# Patient Record
Sex: MALE | Race: WHITE | HISPANIC OR LATINO | Employment: OTHER | URBAN - METROPOLITAN AREA
[De-identification: names, ages, dates, MRNs, and addresses within clinical notes are randomized per-mention and may not be internally consistent; named-entity substitution may affect disease eponyms.]

---

## 2023-05-01 ENCOUNTER — MEDICAL CORRESPONDENCE (OUTPATIENT)
Dept: HEALTH INFORMATION MANAGEMENT | Facility: CLINIC | Age: 78
End: 2023-05-01

## 2023-06-13 RX ORDER — FOLIC ACID 1 MG/1
1 TABLET ORAL DAILY
COMMUNITY

## 2023-06-13 RX ORDER — CHOLECALCIFEROL (VITAMIN D3) 50 MCG
1 TABLET ORAL DAILY
COMMUNITY

## 2023-06-13 RX ORDER — FLUTICASONE PROPIONATE 50 MCG
1 SPRAY, SUSPENSION (ML) NASAL DAILY
COMMUNITY

## 2023-06-13 RX ORDER — AMLODIPINE BESYLATE 5 MG/1
0.5 TABLET ORAL DAILY
COMMUNITY

## 2023-06-13 RX ORDER — KETOCONAZOLE 20 MG/G
CREAM TOPICAL 2 TIMES DAILY
COMMUNITY

## 2023-06-13 RX ORDER — CLOTRIMAZOLE 1 %
CREAM (GRAM) TOPICAL 2 TIMES DAILY
COMMUNITY

## 2023-06-13 RX ORDER — HYDROCORTISONE ACETATE, IODOQUINOL 19; 10 MG/G; MG/G
CREAM TOPICAL 2 TIMES DAILY
COMMUNITY

## 2023-08-03 ENCOUNTER — TRANSFERRED RECORDS (OUTPATIENT)
Dept: MULTI SPECIALTY CLINIC | Facility: CLINIC | Age: 78
End: 2023-08-03

## 2023-08-03 LAB
ALT SERPL-CCNC: <5 IU/L (ref 10–50)
AST SERPL-CCNC: 19 IU/L (ref 10–50)
CHOLESTEROL (EXTERNAL): 127 MG/DL (ref 100–199)
CREATININE (EXTERNAL): 0.82 MG/DL (ref 0.7–1.2)
GFR ESTIMATED (EXTERNAL): 90 ML/MIN/1.73M2
GLUCOSE (EXTERNAL): 91 MG/DL (ref 70–99)
HBA1C MFR BLD: 5.3 %
HDLC SERPL-MCNC: 48 MG/DL
LDL CHOLESTEROL CALCULATED (EXTERNAL): 68 MG/DL
NON HDL CHOLESTEROL (EXTERNAL): 79 MG/DL
POTASSIUM (EXTERNAL): 4 MMOL/L (ref 3.5–5.1)
TRIGLYCERIDES (EXTERNAL): 54 MG/DL
TSH SERPL-ACNC: 2.58 UIU/ML (ref 0.27–4.2)

## 2023-08-16 RX ORDER — FERROUS GLUCONATE 324(38)MG
1 TABLET ORAL
COMMUNITY

## 2023-08-16 NOTE — PROGRESS NOTES
PTA medications updated by Medication Scribe prior to surgery via phone call with patient (last doses completed by Nurse)     Medication history sources: Patient's family/friend (Karla (spouse)) and H&P  In the past week, patient estimated taking medication this percent of the time: Greater than 90%      Significant changes made to the medication list:  None      Additional medication history information:   Patient was advised to bring: Refresh OP, Flonase Nasal [place]    Medication reconciliation completed by provider prior to medication history? No    Time spent in this activity: 25 minutes    The information provided in this note is only as accurate as the sources available at the time of update(s)    Prior to Admission medications    Medication Sig Last Dose Taking? Auth Provider Long Term End Date   amLODIPine (NORVASC) 5 MG tablet Take 0.5 tablets by mouth daily (0.5 x 5 mg = 2.5 mg)  at am Yes Reported, Patient Yes    clotrimazole (LOTRIMIN) 1 % external cream Apply topically 2 times daily 8/16/2023 at pm Yes Reported, Patient     Cyanocobalamin (B-12 PO) Take 1 tablet by mouth daily 8/16/2023 at am Yes Reported, Patient     ferrous gluconate (FERGON) 324 (38 Fe) MG tablet Take 1 tablet by mouth daily (with breakfast) 8/16/2023 at am Yes Reported, Patient     fluticasone (FLONASE) 50 MCG/ACT nasal spray Spray 1 spray into both nostrils daily 8/16/2023 at am Yes Reported, Patient     folic acid (FOLVITE) 1 MG tablet Take 1 tablet by mouth daily 8/16/2023 at am Yes Reported, Patient     Iodoquinol-Hydrocortisone-Aloe 1-1.9 % CREA Externally apply topically 2 times daily 8/16/2023 at pm Yes Reported, Patient     ketoconazole (NIZORAL) 2 % external cream Apply topically 2 times daily 8/16/2023 at pm Yes Reported, Patient     Propylene Glycol-Glycerin 1-0.3 % SOLN Place 1 drop into both eyes as needed Unknown at prn Yes Reported, Patient     vitamin D3 (CHOLECALCIFEROL) 50 mcg (2000 units) tablet Take 1 tablet by  mouth daily 8/16/2023 at am Yes Reported, Patient         Medication history completed by:    Tariq Martinez CPhT  Medication [facility]

## 2023-08-17 ENCOUNTER — HOSPITAL ENCOUNTER (OUTPATIENT)
Facility: CLINIC | Age: 78
Discharge: HOME | End: 2023-08-18
Attending: ORTHOPAEDIC SURGERY | Admitting: ORTHOPAEDIC SURGERY
Payer: COMMERCIAL

## 2023-08-17 ENCOUNTER — APPOINTMENT (OUTPATIENT)
Dept: GENERAL RADIOLOGY | Facility: CLINIC | Age: 78
End: 2023-08-17
Attending: ORTHOPAEDIC SURGERY
Payer: COMMERCIAL

## 2023-08-17 ENCOUNTER — ANESTHESIA EVENT (OUTPATIENT)
Dept: SURGERY | Facility: CLINIC | Age: 78
End: 2023-08-17
Payer: COMMERCIAL

## 2023-08-17 ENCOUNTER — ANESTHESIA (OUTPATIENT)
Dept: SURGERY | Facility: CLINIC | Age: 78
End: 2023-08-17
Payer: COMMERCIAL

## 2023-08-17 DIAGNOSIS — Z96.662 ANKLE JOINT REPLACEMENT STATUS, LEFT: Primary | ICD-10-CM

## 2023-08-17 PROCEDURE — 999N000179 XR SURGERY CARM FLUORO LESS THAN 5 MIN W STILLS

## 2023-08-17 PROCEDURE — 258N000003 HC RX IP 258 OP 636: Performed by: STUDENT IN AN ORGANIZED HEALTH CARE EDUCATION/TRAINING PROGRAM

## 2023-08-17 PROCEDURE — 999N000141 HC STATISTIC PRE-PROCEDURE NURSING ASSESSMENT: Performed by: ORTHOPAEDIC SURGERY

## 2023-08-17 PROCEDURE — 250N000009 HC RX 250: Performed by: ORTHOPAEDIC SURGERY

## 2023-08-17 PROCEDURE — C1713 ANCHOR/SCREW BN/BN,TIS/BN: HCPCS | Performed by: ORTHOPAEDIC SURGERY

## 2023-08-17 PROCEDURE — 250N000011 HC RX IP 250 OP 636: Performed by: PHYSICIAN ASSISTANT

## 2023-08-17 PROCEDURE — 258N000003 HC RX IP 258 OP 636: Performed by: PHYSICIAN ASSISTANT

## 2023-08-17 PROCEDURE — 271N000001 HC OR GENERAL SUPPLY NON-STERILE: Performed by: ORTHOPAEDIC SURGERY

## 2023-08-17 PROCEDURE — 272N000001 HC OR GENERAL SUPPLY STERILE: Performed by: ORTHOPAEDIC SURGERY

## 2023-08-17 PROCEDURE — 250N000009 HC RX 250: Performed by: ANESTHESIOLOGY

## 2023-08-17 PROCEDURE — C1776 JOINT DEVICE (IMPLANTABLE): HCPCS | Performed by: ORTHOPAEDIC SURGERY

## 2023-08-17 PROCEDURE — 360N000084 HC SURGERY LEVEL 4 W/ FLUORO, PER MIN: Performed by: ORTHOPAEDIC SURGERY

## 2023-08-17 PROCEDURE — 370N000017 HC ANESTHESIA TECHNICAL FEE, PER MIN: Performed by: ORTHOPAEDIC SURGERY

## 2023-08-17 PROCEDURE — 710N000009 HC RECOVERY PHASE 1, LEVEL 1, PER MIN: Performed by: ORTHOPAEDIC SURGERY

## 2023-08-17 PROCEDURE — 250N000009 HC RX 250: Performed by: NURSE ANESTHETIST, CERTIFIED REGISTERED

## 2023-08-17 PROCEDURE — 250N000025 HC SEVOFLURANE, PER MIN: Performed by: ORTHOPAEDIC SURGERY

## 2023-08-17 PROCEDURE — 250N000011 HC RX IP 250 OP 636: Mod: JZ | Performed by: PHYSICIAN ASSISTANT

## 2023-08-17 PROCEDURE — 250N000013 HC RX MED GY IP 250 OP 250 PS 637: Performed by: PHYSICIAN ASSISTANT

## 2023-08-17 PROCEDURE — 258N000003 HC RX IP 258 OP 636: Performed by: NURSE ANESTHETIST, CERTIFIED REGISTERED

## 2023-08-17 PROCEDURE — 250N000011 HC RX IP 250 OP 636: Performed by: NURSE ANESTHETIST, CERTIFIED REGISTERED

## 2023-08-17 DEVICE — TALAR DOME, SIZE 1, LEFT
Type: IMPLANTABLE DEVICE | Site: ANKLE | Status: FUNCTIONAL
Brand: SALTO TALARIS®

## 2023-08-17 DEVICE — IMP INSERT TORNIER TIBIAL ANKLE SIZE 1X9MM LT LJU246: Type: IMPLANTABLE DEVICE | Site: ANKLE | Status: FUNCTIONAL

## 2023-08-17 DEVICE — INTERNALBRACE LIGAMENT
Type: IMPLANTABLE DEVICE | Site: ANKLE | Status: FUNCTIONAL
Brand: ARTHREX®

## 2023-08-17 DEVICE — TIBIAL TRAY, XL, SIZE 1
Type: IMPLANTABLE DEVICE | Site: ANKLE | Status: FUNCTIONAL
Brand: SALTO TALARIS®

## 2023-08-17 RX ORDER — CETIRIZINE HYDROCHLORIDE 10 MG/1
10 TABLET ORAL
COMMUNITY

## 2023-08-17 RX ORDER — SODIUM CHLORIDE 9 MG/ML
INJECTION, SOLUTION INTRAVENOUS CONTINUOUS
Status: DISCONTINUED | OUTPATIENT
Start: 2023-08-17 | End: 2023-08-18 | Stop reason: HOSPADM

## 2023-08-17 RX ORDER — CEFAZOLIN SODIUM/WATER 2 G/20 ML
2 SYRINGE (ML) INTRAVENOUS SEE ADMIN INSTRUCTIONS
Status: DISCONTINUED | OUTPATIENT
Start: 2023-08-17 | End: 2023-08-17 | Stop reason: HOSPADM

## 2023-08-17 RX ORDER — FENTANYL CITRATE 50 UG/ML
25 INJECTION, SOLUTION INTRAMUSCULAR; INTRAVENOUS EVERY 5 MIN PRN
Status: DISCONTINUED | OUTPATIENT
Start: 2023-08-17 | End: 2023-08-17 | Stop reason: HOSPADM

## 2023-08-17 RX ORDER — FLUTICASONE PROPIONATE 50 MCG
1 SPRAY, SUSPENSION (ML) NASAL DAILY
Status: DISCONTINUED | OUTPATIENT
Start: 2023-08-17 | End: 2023-08-18 | Stop reason: HOSPADM

## 2023-08-17 RX ORDER — NALOXONE HYDROCHLORIDE 0.4 MG/ML
0.2 INJECTION, SOLUTION INTRAMUSCULAR; INTRAVENOUS; SUBCUTANEOUS
Status: DISCONTINUED | OUTPATIENT
Start: 2023-08-17 | End: 2023-08-18 | Stop reason: HOSPADM

## 2023-08-17 RX ORDER — OXYCODONE HYDROCHLORIDE 5 MG/1
5-10 TABLET ORAL EVERY 4 HOURS PRN
Qty: 24 TABLET | Refills: 0 | Status: SHIPPED | OUTPATIENT
Start: 2023-08-17

## 2023-08-17 RX ORDER — TRANEXAMIC ACID 650 MG/1
1950 TABLET ORAL ONCE
Status: COMPLETED | OUTPATIENT
Start: 2023-08-17 | End: 2023-08-17

## 2023-08-17 RX ORDER — GABAPENTIN 300 MG/1
300 CAPSULE ORAL 3 TIMES DAILY
Qty: 9 CAPSULE | Refills: 0 | Status: SHIPPED | OUTPATIENT
Start: 2023-08-17 | End: 2023-08-20

## 2023-08-17 RX ORDER — CEFAZOLIN SODIUM/WATER 2 G/20 ML
2 SYRINGE (ML) INTRAVENOUS
Status: COMPLETED | OUTPATIENT
Start: 2023-08-17 | End: 2023-08-17

## 2023-08-17 RX ORDER — IBUPROFEN 600 MG/1
600 TABLET, FILM COATED ORAL EVERY 6 HOURS PRN
Status: DISCONTINUED | OUTPATIENT
Start: 2023-08-17 | End: 2023-08-18 | Stop reason: HOSPADM

## 2023-08-17 RX ORDER — HYDROXYZINE HYDROCHLORIDE 10 MG/1
10 TABLET, FILM COATED ORAL EVERY 6 HOURS PRN
Status: DISCONTINUED | OUTPATIENT
Start: 2023-08-17 | End: 2023-08-18 | Stop reason: HOSPADM

## 2023-08-17 RX ORDER — HYDROXYZINE HYDROCHLORIDE 10 MG/1
10 TABLET, FILM COATED ORAL EVERY 6 HOURS PRN
Qty: 30 TABLET | Refills: 0 | Status: SHIPPED | OUTPATIENT
Start: 2023-08-17

## 2023-08-17 RX ORDER — FOLIC ACID 1 MG/1
1000 TABLET ORAL DAILY
Status: DISCONTINUED | OUTPATIENT
Start: 2023-08-17 | End: 2023-08-18 | Stop reason: HOSPADM

## 2023-08-17 RX ORDER — NALOXONE HYDROCHLORIDE 0.4 MG/ML
0.4 INJECTION, SOLUTION INTRAMUSCULAR; INTRAVENOUS; SUBCUTANEOUS
Status: DISCONTINUED | OUTPATIENT
Start: 2023-08-17 | End: 2023-08-18 | Stop reason: HOSPADM

## 2023-08-17 RX ORDER — HYDROMORPHONE HCL IN WATER/PF 6 MG/30 ML
0.2 PATIENT CONTROLLED ANALGESIA SYRINGE INTRAVENOUS EVERY 5 MIN PRN
Status: DISCONTINUED | OUTPATIENT
Start: 2023-08-17 | End: 2023-08-17 | Stop reason: HOSPADM

## 2023-08-17 RX ORDER — CLOTRIMAZOLE 1 %
CREAM (GRAM) TOPICAL 2 TIMES DAILY
Status: DISCONTINUED | OUTPATIENT
Start: 2023-08-17 | End: 2023-08-18 | Stop reason: HOSPADM

## 2023-08-17 RX ORDER — ONDANSETRON 2 MG/ML
4 INJECTION INTRAMUSCULAR; INTRAVENOUS EVERY 6 HOURS PRN
Status: DISCONTINUED | OUTPATIENT
Start: 2023-08-17 | End: 2023-08-18 | Stop reason: HOSPADM

## 2023-08-17 RX ORDER — ONDANSETRON 4 MG/1
4 TABLET, ORALLY DISINTEGRATING ORAL EVERY 6 HOURS PRN
Status: DISCONTINUED | OUTPATIENT
Start: 2023-08-17 | End: 2023-08-18 | Stop reason: HOSPADM

## 2023-08-17 RX ORDER — PROPOFOL 10 MG/ML
INJECTION, EMULSION INTRAVENOUS PRN
Status: DISCONTINUED | OUTPATIENT
Start: 2023-08-17 | End: 2023-08-17

## 2023-08-17 RX ORDER — CARBOXYMETHYLCELLULOSE SODIUM 10 MG/ML
1 GEL OPHTHALMIC 2 TIMES DAILY PRN
Status: DISCONTINUED | OUTPATIENT
Start: 2023-08-17 | End: 2023-08-18 | Stop reason: HOSPADM

## 2023-08-17 RX ORDER — ONDANSETRON 2 MG/ML
INJECTION INTRAMUSCULAR; INTRAVENOUS PRN
Status: DISCONTINUED | OUTPATIENT
Start: 2023-08-17 | End: 2023-08-17

## 2023-08-17 RX ORDER — ACETAMINOPHEN 325 MG/1
650 TABLET ORAL EVERY 4 HOURS PRN
Qty: 100 TABLET | Refills: 0 | Status: SHIPPED | OUTPATIENT
Start: 2023-08-17

## 2023-08-17 RX ORDER — FENTANYL CITRATE 50 UG/ML
50 INJECTION, SOLUTION INTRAMUSCULAR; INTRAVENOUS EVERY 5 MIN PRN
Status: DISCONTINUED | OUTPATIENT
Start: 2023-08-17 | End: 2023-08-17 | Stop reason: HOSPADM

## 2023-08-17 RX ORDER — SODIUM CHLORIDE, SODIUM LACTATE, POTASSIUM CHLORIDE, CALCIUM CHLORIDE 600; 310; 30; 20 MG/100ML; MG/100ML; MG/100ML; MG/100ML
INJECTION, SOLUTION INTRAVENOUS CONTINUOUS
Status: DISCONTINUED | OUTPATIENT
Start: 2023-08-17 | End: 2023-08-17 | Stop reason: HOSPADM

## 2023-08-17 RX ORDER — CHOLECALCIFEROL (VITAMIN D3) 50 MCG
50 TABLET ORAL DAILY
Status: DISCONTINUED | OUTPATIENT
Start: 2023-08-17 | End: 2023-08-18 | Stop reason: HOSPADM

## 2023-08-17 RX ORDER — OXYCODONE HYDROCHLORIDE 5 MG/1
10 TABLET ORAL EVERY 4 HOURS PRN
Status: DISCONTINUED | OUTPATIENT
Start: 2023-08-17 | End: 2023-08-18 | Stop reason: HOSPADM

## 2023-08-17 RX ORDER — ACETAMINOPHEN 325 MG/1
975 TABLET ORAL EVERY 6 HOURS PRN
Status: DISCONTINUED | OUTPATIENT
Start: 2023-08-17 | End: 2023-08-18 | Stop reason: HOSPADM

## 2023-08-17 RX ORDER — HYDROMORPHONE HCL IN WATER/PF 6 MG/30 ML
0.4 PATIENT CONTROLLED ANALGESIA SYRINGE INTRAVENOUS EVERY 5 MIN PRN
Status: DISCONTINUED | OUTPATIENT
Start: 2023-08-17 | End: 2023-08-17 | Stop reason: HOSPADM

## 2023-08-17 RX ORDER — AMOXICILLIN 250 MG
1-2 CAPSULE ORAL 2 TIMES DAILY
Qty: 10 TABLET | Refills: 0 | Status: SHIPPED | OUTPATIENT
Start: 2023-08-17

## 2023-08-17 RX ORDER — HYDROMORPHONE HCL IN WATER/PF 6 MG/30 ML
0.2 PATIENT CONTROLLED ANALGESIA SYRINGE INTRAVENOUS
Status: DISCONTINUED | OUTPATIENT
Start: 2023-08-17 | End: 2023-08-18 | Stop reason: HOSPADM

## 2023-08-17 RX ORDER — LIDOCAINE HYDROCHLORIDE 20 MG/ML
INJECTION, SOLUTION INFILTRATION; PERINEURAL PRN
Status: DISCONTINUED | OUTPATIENT
Start: 2023-08-17 | End: 2023-08-17

## 2023-08-17 RX ORDER — FERROUS GLUCONATE 324(38)MG
324 TABLET ORAL
Status: DISCONTINUED | OUTPATIENT
Start: 2023-08-18 | End: 2023-08-18 | Stop reason: HOSPADM

## 2023-08-17 RX ORDER — HYDROMORPHONE HCL IN WATER/PF 6 MG/30 ML
0.4 PATIENT CONTROLLED ANALGESIA SYRINGE INTRAVENOUS
Status: DISCONTINUED | OUTPATIENT
Start: 2023-08-17 | End: 2023-08-18 | Stop reason: HOSPADM

## 2023-08-17 RX ORDER — KETOCONAZOLE 20 MG/G
CREAM TOPICAL 2 TIMES DAILY
Status: DISCONTINUED | OUTPATIENT
Start: 2023-08-17 | End: 2023-08-18 | Stop reason: HOSPADM

## 2023-08-17 RX ORDER — ONDANSETRON 2 MG/ML
4 INJECTION INTRAMUSCULAR; INTRAVENOUS EVERY 30 MIN PRN
Status: DISCONTINUED | OUTPATIENT
Start: 2023-08-17 | End: 2023-08-17 | Stop reason: HOSPADM

## 2023-08-17 RX ORDER — ASPIRIN 325 MG
325 TABLET ORAL DAILY
Status: DISCONTINUED | OUTPATIENT
Start: 2023-08-18 | End: 2023-08-18 | Stop reason: HOSPADM

## 2023-08-17 RX ORDER — MAGNESIUM HYDROXIDE 1200 MG/15ML
LIQUID ORAL PRN
Status: DISCONTINUED | OUTPATIENT
Start: 2023-08-17 | End: 2023-08-17 | Stop reason: HOSPADM

## 2023-08-17 RX ORDER — CEFAZOLIN SODIUM 2 G/100ML
2 INJECTION, SOLUTION INTRAVENOUS EVERY 8 HOURS
Status: COMPLETED | OUTPATIENT
Start: 2023-08-17 | End: 2023-08-17

## 2023-08-17 RX ORDER — AMLODIPINE BESYLATE 2.5 MG/1
2.5 TABLET ORAL DAILY
Status: DISCONTINUED | OUTPATIENT
Start: 2023-08-18 | End: 2023-08-18 | Stop reason: HOSPADM

## 2023-08-17 RX ORDER — ONDANSETRON 4 MG/1
4 TABLET, ORALLY DISINTEGRATING ORAL EVERY 30 MIN PRN
Status: DISCONTINUED | OUTPATIENT
Start: 2023-08-17 | End: 2023-08-17 | Stop reason: HOSPADM

## 2023-08-17 RX ORDER — PROCHLORPERAZINE MALEATE 5 MG
5 TABLET ORAL EVERY 6 HOURS PRN
Status: DISCONTINUED | OUTPATIENT
Start: 2023-08-17 | End: 2023-08-18 | Stop reason: HOSPADM

## 2023-08-17 RX ORDER — LIDOCAINE 40 MG/G
CREAM TOPICAL
Status: DISCONTINUED | OUTPATIENT
Start: 2023-08-17 | End: 2023-08-18 | Stop reason: HOSPADM

## 2023-08-17 RX ORDER — OXYCODONE HYDROCHLORIDE 5 MG/1
5 TABLET ORAL EVERY 4 HOURS PRN
Status: DISCONTINUED | OUTPATIENT
Start: 2023-08-17 | End: 2023-08-18 | Stop reason: HOSPADM

## 2023-08-17 RX ADMIN — OXYCODONE HYDROCHLORIDE 10 MG: 5 TABLET ORAL at 20:06

## 2023-08-17 RX ADMIN — LIDOCAINE HYDROCHLORIDE 80 MG: 20 INJECTION, SOLUTION INFILTRATION; PERINEURAL at 07:39

## 2023-08-17 RX ADMIN — PHENYLEPHRINE HYDROCHLORIDE 100 MCG: 10 INJECTION INTRAVENOUS at 07:42

## 2023-08-17 RX ADMIN — PROPOFOL 180 MG: 10 INJECTION, EMULSION INTRAVENOUS at 07:39

## 2023-08-17 RX ADMIN — PHENYLEPHRINE HYDROCHLORIDE 100 MCG: 10 INJECTION INTRAVENOUS at 07:53

## 2023-08-17 RX ADMIN — SODIUM CHLORIDE, POTASSIUM CHLORIDE, SODIUM LACTATE AND CALCIUM CHLORIDE: 600; 310; 30; 20 INJECTION, SOLUTION INTRAVENOUS at 06:09

## 2023-08-17 RX ADMIN — FOLIC ACID 1000 MCG: 1 TABLET ORAL at 14:29

## 2023-08-17 RX ADMIN — ONDANSETRON 4 MG: 2 INJECTION INTRAMUSCULAR; INTRAVENOUS at 08:49

## 2023-08-17 RX ADMIN — Medication 2 G: at 07:29

## 2023-08-17 RX ADMIN — SODIUM CHLORIDE: 9 INJECTION, SOLUTION INTRAVENOUS at 14:27

## 2023-08-17 RX ADMIN — PHENYLEPHRINE HYDROCHLORIDE 0.4 MCG/KG/MIN: 10 INJECTION INTRAVENOUS at 07:50

## 2023-08-17 RX ADMIN — SODIUM CHLORIDE, POTASSIUM CHLORIDE, SODIUM LACTATE AND CALCIUM CHLORIDE: 600; 310; 30; 20 INJECTION, SOLUTION INTRAVENOUS at 09:26

## 2023-08-17 RX ADMIN — HYDROXYZINE HYDROCHLORIDE 10 MG: 10 TABLET ORAL at 20:06

## 2023-08-17 RX ADMIN — CEFAZOLIN SODIUM 2 G: 2 INJECTION, SOLUTION INTRAVENOUS at 15:23

## 2023-08-17 RX ADMIN — CLOTRIMAZOLE: 0.01 CREAM TOPICAL at 20:49

## 2023-08-17 RX ADMIN — FLUTICASONE PROPIONATE 1 SPRAY: 50 SPRAY, METERED NASAL at 14:28

## 2023-08-17 RX ADMIN — PROPOFOL 20 MG: 10 INJECTION, EMULSION INTRAVENOUS at 09:12

## 2023-08-17 RX ADMIN — TRANEXAMIC ACID 1950 MG: 650 TABLET ORAL at 05:48

## 2023-08-17 RX ADMIN — BUPIVACAINE HYDROCHLORIDE 20 ML: 5 INJECTION, SOLUTION EPIDURAL; INTRACAUDAL at 07:00

## 2023-08-17 RX ADMIN — CEFAZOLIN SODIUM 2 G: 2 INJECTION, SOLUTION INTRAVENOUS at 23:20

## 2023-08-17 RX ADMIN — ACETAMINOPHEN 975 MG: 325 TABLET, FILM COATED ORAL at 20:48

## 2023-08-17 RX ADMIN — PHENYLEPHRINE HYDROCHLORIDE 100 MCG: 10 INJECTION INTRAVENOUS at 07:44

## 2023-08-17 RX ADMIN — PHENYLEPHRINE HYDROCHLORIDE 100 MCG: 10 INJECTION INTRAVENOUS at 08:05

## 2023-08-17 RX ADMIN — Medication 50 MCG: at 14:29

## 2023-08-17 ASSESSMENT — ACTIVITIES OF DAILY LIVING (ADL)
ADLS_ACUITY_SCORE: 20
CHANGE_IN_FUNCTIONAL_STATUS_SINCE_ONSET_OF_CURRENT_ILLNESS/INJURY: NO
CONCENTRATING,_REMEMBERING_OR_MAKING_DECISIONS_DIFFICULTY: NO
ADLS_ACUITY_SCORE: 35
DOING_ERRANDS_INDEPENDENTLY_DIFFICULTY: NO
ADLS_ACUITY_SCORE: 20
TOILETING_ISSUES: NO
ADLS_ACUITY_SCORE: 20
ADLS_ACUITY_SCORE: 20
DRESSING/BATHING_DIFFICULTY: NO
ADLS_ACUITY_SCORE: 20
ADLS_ACUITY_SCORE: 20
DIFFICULTY_EATING/SWALLOWING: NO
WEAR_GLASSES_OR_BLIND: YES
FALL_HISTORY_WITHIN_LAST_SIX_MONTHS: NO
WALKING_OR_CLIMBING_STAIRS_DIFFICULTY: NO

## 2023-08-17 NOTE — INTERVAL H&P NOTE
"I have reviewed the surgical (or preoperative) H&P that is linked to this encounter, and examined the patient. There are no significant changes    Clinical Conditions Present on Arrival:  Clinically Significant Risk Factors Present on Admission                  # Obesity: Estimated body mass index is 34.22 kg/m  as calculated from the following:    Height as of this encounter: 1.676 m (5' 6\").    Weight as of this encounter: 96.2 kg (212 lb).       "

## 2023-08-17 NOTE — PLAN OF CARE
Goal Outcome Evaluation:    Patient vital signs are at baseline: Yes  Patient able to ambulate as they were prior to admission or with assist devices provided by therapies during their stay:  No,  Reason:  PT following  Patient MUST void prior to discharge:  Yes  Patient able to tolerate oral intake:  Yes  Pain has adequate pain control using Oral analgesics:  No,  Reason:  block in place  Does patient have an identified :  Yes  Has goal D/C date and time been discussed with patient:  Yes

## 2023-08-17 NOTE — ANESTHESIA POSTPROCEDURE EVALUATION
Patient: Luis Manuel Winter    Procedure: Procedure(s):  Left total ankle arthroplasty, achilles lengthening, modified brostrom lateral ligament reconstruction       Anesthesia Type:  General    Note:  Disposition: Admission   Postop Pain Control: Uneventful            Sign Out: Well controlled pain   PONV: No   Neuro/Psych: Uneventful            Sign Out: Acceptable/Baseline neuro status   Airway/Respiratory: Uneventful            Sign Out: Acceptable/Baseline resp. status   CV/Hemodynamics: Uneventful            Sign Out: Acceptable CV status   Other NRE:    DID A NON-ROUTINE EVENT OCCUR? No           Last vitals:  Vitals Value Taken Time   /76 08/17/23 1030   Temp 36.4  C (97.5  F) 08/17/23 1015   Pulse 68 08/17/23 1035   Resp 50 08/17/23 1035   SpO2 96 % 08/17/23 1035   Vitals shown include unvalidated device data.    Electronically Signed By: Neena Tomlin  August 17, 2023  1:47 PM

## 2023-08-17 NOTE — ANESTHESIA PROCEDURE NOTES
Airway       Patient location during procedure: OR  Staff -        Anesthesiologist:  Neena Tomlin       CRNA: Miladys Archer APRN CRNA       Performed By: CRNA  Consent for Airway        Urgency: elective  Indications and Patient Condition       Indications for airway management: tali-procedural         Mask difficulty assessment: 0 - not attempted    Final Airway Details       Final airway type: supraglottic airway    Supraglottic Airway Details        Type: LMA       Brand: I-Gel       LMA size: 5    Post intubation assessment        Placement verified by: capnometry, equal breath sounds and chest rise        Number of attempts at approach: 1       Number of other approaches attempted: 0       Ease of procedure: easy       Dentition: Intact and Unchanged

## 2023-08-17 NOTE — ANESTHESIA PROCEDURE NOTES
Popliteal Procedure Note    Pre-Procedure   Staff -        Anesthesiologist:  Neena Tomlin       Performed By: anesthesiologist       Location: pre-op       Pre-Anesthestic Checklist: patient identified, IV checked, site marked, risks and benefits discussed, informed consent, monitors and equipment checked, pre-op evaluation, at physician/surgeon's request and post-op pain management  Timeout:       Correct Patient: Yes        Correct Site: Yes        Correct Position: Yes        Correct Laterality: N/A        Site Marked: N/A  Procedure Documentation  Procedure: Popliteal       Patient Position: supine       Skin prep: Chloraprep       Local skin infiltrated with mL of 1% lidocaine.        Needle Type: insulated and short bevel       Needle Length (millimeters): 100        Catheter: 18 G.                Ultrasound guided       1. Ultrasound was used to identify targeted nerve, plexus, vascular marker, or fascial plane and place a needle adjacent to it in real-time.       2. Ultrasound was used to visualize the spread of anesthetic in close proximity to the above referenced structure.       3. A permanent image is entered into the patient's record.       4. The visualized anatomic structures appeared normal.       5. There were no apparent abnormal pathologic findings.    Assessment/Narrative         The placement was negative for: blood aspirated, painful injection and site bleeding       Paresthesias: No.       Insertion/Infusion Method: Single Shot       Complications: none       Injection made incrementally with aspirations every 3 mL.    Medication(s) Administered   Bupivacaine 0.5% w/ 1:400K Epi (Injection) - Injection   20 mL - 8/17/2023 7:00:00 AM   Comments:  Needle tip visualized throughout.  No nerve swelling.     Patient tolerated procedure well.  No known complications.     The surgeon has given a verbal order transferring care of this patient to me for the performance of a regional  "analgesia block for post-op pain control. It is requested of me because I am uniquely trained and qualified to perform this block and the surgeon is neither trained nor qualified to perform this procedure.      FOR [facility] ([facility]) ONLY:   Pain Team Contact information: please page the Pain Team Via [place]. Search \"Pain\". During daytime hours, please page the attending first. At night please page the resident first.      "

## 2023-08-17 NOTE — OP NOTE
PREOPERATIVE DIAGNOSIS:   1.  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  2.  Left chronic lateral ankle ligament instability.    POSTOPERATIVE DIAGNOSIS:   1.  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  2.  Left chronic lateral ankle ligament instability.  3.  Left Achilles contracture.    PROCEDURE:  1.  Left total ankle arthroplasty.   2.  Left modified Broström lateral ligament reconstruction with suture brace augmentation.  3.  Left percutaneous tendo Achilles lengthening.    ATTENDING SURGEON: Chavez Natarajan MD.    ASSISTANT SURGEON: Lamont Moralez PA-C.    ANESTHESIA: General with regional nerve block.    ESTIMATED BLOOD LOSS: Minimal.    TOURNIQUET TIME: Approximately 100 minutes.     IMPLANTS: Michael Talaris total ankle arthroplasty with size 1 XL tibia, size 1 talus, and 9 mm polyethylene insert; Arthrex InternalBrace.    COMPLICATIONS: None apparent.    Of note, a skilled surgical assistant, Lamont Moralez PA-C, was necessary and utilized during the case to assist with patient positioning, prepping and draping, soft tissue retraction and stabilizing the leg while completing the procedure, completing the bone and soft tissue work, wound closure, and splint application.  The assistant was present throughout the entire case.    INDICATIONS: Henri is a very pleasant 78-year-old gentleman who presented to my clinic for evaluation of left ankle degenerative joint disease. The patient has undergone multiple conservative treatments for control of discomfort including brace immobilization, ice, and anti-inflammatory medications. The patient noted significant decreased quality of life as a result of ankle pain.  The patient demonstrated preserved range of motion of the ankle, and the benefits and risks of total arthroplasty versus arthrodesis were discussed with the patient. Given the patient's activity level, age, and desire to maintain motion at the ankle joint, arthroplasty was selected.  The benefits and risks of surgery were discussed in full with the patient, and they provided informed consent to proceed.    DESCRIPTION OF PROCEDURE: The patient was identified in the preoperative holding area on the date of surgery. The operative site was marked with an indelible marker, and the patient was brought back to the operating room and transferred to the operating table in a supine position. All bony prominences were well padded. Anesthesia was administered without complication. The left lower extremity was prepped and draped in standard sterile fashion. A preoperative timeout was performed identifying the correct patient, procedure, operative site, antibiotic administration and equipment necessary for the procedure.    After Esmarch exsanguination, an upper thigh tourniquet was inflated. A longitudinal incision was made centered over the anterior ankle joint line. Soft tissue dissection was carefully carried down, protecting the superficial peroneal nerve at the very distal aspect of the incision. The extensor retinaculum was incised, taking care to maintain the anterior tibialis tendon within its own sheath for protection throughout the remainder of the case. Deep soft tissue dissection was carefully carried down, retracting the deep neurovascular bundle laterally with the digital extensor tendons for protection. There was marked synovitis over the anterior aspect of the ankle joint, which was carefully debrided. The ankle joint was easily visualized. Elevation of the periosteum was performed to allow for adequate visualization of the distal tibia and ankle joint line. The prominent distal tibia anterior spurs were resected to the level of the tibial plafond.  A pin was inserted in percutaneous fashion in the proximal tibia at the level of the tibial tubercle for placement of the guide chelsy. The guide chelsy was then secured distally with a separate pin in the distal tibia. Utilizing fluoroscopic imaging,  the guide chelsy was aligned in appropriate orientation, taking a 9 mm bone resection from the distal tibia and correcting the patient's varus deformity. A size 1 component was selected to remove minimal bone from the medial malleolus and limit impingement on the fibula. Pins were placed to protect the medial and lateral malleoli during the distal tibia resection. After the bone cut, the anterior aspect of the distal tibia bone was resected from the wound.    The posterior talar chamfer guide was then secured, and the posterior chamfer cut created. The anterior talar chamfer trish was secured in appropriate position. The anterior chamfer cut was made. Care was taken to remove any prominent anterior talar neck bone to allow for appropriate positioning of the chamfer cutting guides. At this point, distractors were placed into the ankle joint to assess for balancing. The ankle was well balanced in the coronal plane.  The lateral chamfer guide was then secured. The lateral chamfer cut was created after creating the bell hole in the central aspect of the talus. The remaining posterior tibial bone cut was removed prior to placement of the trial components.  An additional wedge of bone was resected from the lateral aspect of the distal tibia, to help improve slight residual varus deformity.  After reinserting the trial tibial component, fluoroscopic imaging confirmed appropriate alignment of the trial components.  Despite excellent and improved alignment of the ankle replacement components, persistent lateral ligament instability was noted with varus stress.    With the trial components in place, the patient was noted to demonstrate relatively neutral dorsiflexion with the ankle and hindfoot in a neutral position. I elected to proceed with a tendoAchilles lengthening to improve dorsiflexion range of motion. A triple-cut chau-section lengthening was performed with percutaneous incisions. The ankle was carefully manipulated with  approximately 20 degrees of dorsiflexion achieved. The Achilles tendon remained intact following the manipulation.    The wound was copiously irrigated. The above-noted components were implanted into position after preparing the tibial keel. Fluoroscopic imaging confirmed appropriate seating of the components. The patient demonstrated excellent range of motion in dorsiflexion and plantarflexion after implantation of the components. The wound was once again copiously irrigated. The extensor retinaculum was reapproximated with interrupted 2-0 Vicryl suture. The subcutaneous tissues and skin were reapproximated with interrupted 3-0 Monocryl and 3-0 nylon sutures respectively.     A curvilinear incision was then made along the lateral aspect of the ankle directly over the distal fibula.  Soft tissue dissection was carefully carried down maintaining excellent hemostasis.  The extensor retinaculum was sharply incised.  Significant scarring and attenuation of the ATFL tissue was noted.  The ATFL was incised in line with the anterior border of the distal fibula.  An anchor for the Arthrex InternalBrace was secured within the lateral body of the talus.  After debriding a small amount of bone from the anterior portion of the distal fibula, the ATFL was imbricated in pants over vest fashion with 0 Ethibond suture.  The fibular anchor for the suture brace was secured over the ATFL repair, maintaining excellent tension along the suture brace.  Excellent stability of the ankle was noted with varus stress after completing the ligament reconstruction.  The extensor retinaculum was imbricated with 2-0 Vicryl suture.  Subcutaneous tissues and skin were reapproximated with interrupted 3-0 Monocryl and 3-0 nylon sutures respectively.    Xeroform and sterile dressings were applied. The patient was placed in a short leg splint. The patient was extubated and brought to the PACU in stable condition for further postoperative  care.    Postoperatively, the patient will remain nonweightbearing on the left lower extremity. The patient will be placed on aspirin for DVT prophylaxis postoperatively. The patient will be admitted to the hospital for rehabilitation and pain control. The patient will plan return to clinic for followup in 2 weeks for repeat evaluation including wound check, suture removal and weightbearing radiographs of the left ankle.    Of note, all counts were correct at the conclusion of the case.

## 2023-08-17 NOTE — ANESTHESIA PREPROCEDURE EVALUATION
Anesthesia Pre-Procedure Evaluation    Patient: Luis Manuel Winter   MRN: [id number] : 1945        Procedure : Procedure(s):  Left total ankle arthroplasty, possible achilles lengthening, possible modified brostrom lateral ligament reconstruction          Past Medical History:   Diagnosis Date    Dermatophytosis of nail     Hypertension     Sleep apnea       Past Surgical History:   Procedure Laterality Date    left rotator cuff repair Left     VASECTOMY        No Known Allergies   Social History     Tobacco Use    Smoking status: Never    Smokeless tobacco: Never   Substance Use Topics    Alcohol use: Never      Wt Readings from Last 1 Encounters:   23 96.2 kg (212 lb)        Anesthesia Evaluation   Pt has had prior anesthetic.     History of anesthetic complications (Patient reports memory issues after anesthesia)       ROS/MED HX  ENT/Pulmonary:     (+) sleep apnea, uses CPAP,                                     Neurologic:       Cardiovascular:     (+)  hypertension- -   -  - -                                      METS/Exercise Tolerance: >4 METS    Hematologic:       Musculoskeletal:   (+)  arthritis,             GI/Hepatic:    (-) GERD and liver disease   Renal/Genitourinary:    (-) renal disease   Endo:       Psychiatric/Substance Use:       Infectious Disease:       Malignancy:       Other:            Physical Exam    Airway        Mallampati: III   TM distance: > 3 FB   Neck ROM: full     Respiratory Devices and Support         Dental     Comment: Gold caps         Cardiovascular   cardiovascular exam normal          Pulmonary   pulmonary exam normal                OUTSIDE LABS:  CBC: No results found for: WBC, HGB, HCT, PLT  BMP: No results found for: NA, POTASSIUM, CHLORIDE, CO2, BUN, CR, GLC  COAGS: No results found for: PTT, INR, FIBR  POC: No results found for: BGM, HCG, HCGS  HEPATIC: No results found for: ALBUMIN, PROTTOTAL, ALT, AST, GGT, ALKPHOS, BILITOTAL, BILIDIRECT, KRISTOFER  OTHER: No  results found for: PH, LACT, A1C, NICO, PHOS, MAG, LIPASE, AMYLASE, TSH, T4, T3, CRP, SED    Anesthesia Plan    ASA Status:  2       Anesthesia Type: General.     - Airway: LMA              Consents    Anesthesia Plan(s) and associated risks, benefits, and realistic alternatives discussed. Questions answered and patient/representative(s) expressed understanding.     - Discussed:     - Discussed with:  Patient            Postoperative Care    Pain management: Peripheral nerve block (Single Shot).   PONV prophylaxis: Ondansetron (or other 5HT-3), Dexamethasone or Solumedrol, Background Propofol Infusion     Comments:                Neena Tomlin

## 2023-08-17 NOTE — ANESTHESIA PROCEDURE NOTES
Saphenous Procedure Note    Pre-Procedure   Staff -        Anesthesiologist:  Neena Tomlin       Performed By: anesthesiologist       Location: pre-op       Pre-Anesthestic Checklist: patient identified, IV checked, site marked, risks and benefits discussed, informed consent, monitors and equipment checked, pre-op evaluation, at physician/surgeon's request and post-op pain management  Timeout:       Correct Patient: Yes        Correct Procedure: Yes        Correct Site: Yes        Correct Position: Yes   Procedure Documentation  Procedure: Saphenous       Patient Position: supine       Local skin infiltrated with 3 mL of 1% lidocaine.        Needle Length (millimeters): 100        1. Ultrasound was used to identify targeted nerve, plexus, vascular marker, or fascial plane and place a needle adjacent to it in real-time.       2. Ultrasound was used to visualize the spread of anesthetic in close proximity to the above referenced structure.       3. A permanent image is entered into the patient's record.       4. The visualized anatomic structures appeared normal.       5. There were no apparent abnormal pathologic findings.    Assessment/Narrative         The placement was negative for: blood aspirated, painful injection and site bleeding       Paresthesias: No.       Insertion/Infusion Method: Single Shot       Complications: none       Injection made incrementally with aspirations every 3 mL.    Medication(s) Administered   Bupivacaine 0.5% w/ 1:400K Epi (Injection) - Injection   10 mL - 8/17/2023 7:00:00 AM   Comments:  Needle tip visualized throught out.  No nerve swelling.     Patient tolerated well.  No known complications.      The surgeon has given a verbal order transferring care of this patient to me for the performance of a regional analgesia block for post-op pain control. It is requested of me because I am uniquely trained and qualified to perform this block and the surgeon is neither trained  "nor qualified to perform this procedure.      FOR [facility] ([facility]) ONLY:   Pain Team Contact information: please page the Pain Team Via [facility]. Search \"Pain\". During daytime hours, please page the attending first. At night please page the resident first.      "

## 2023-08-17 NOTE — ANESTHESIA CARE TRANSFER NOTE
Patient: Luis Manuel Winter    Procedure: Procedure(s):  Left total ankle arthroplasty, achilles lengthening, modified brostrom lateral ligament reconstruction       Diagnosis: Degenerative arthritis of left foot [M19.072]  Diagnosis Additional Information: No value filed.    Anesthesia Type:   General     Note:    Oropharynx: oropharynx clear of all foreign objects and spontaneously breathing  Level of Consciousness: awake  Oxygen Supplementation: nasal cannula  Level of Supplemental Oxygen (L/min / FiO2): 2  Independent Airway: airway patency satisfactory and stable  Dentition: dentition unchanged  Vital Signs Stable: post-procedure vital signs reviewed and stable  Report to RN Given: handoff report given  Patient transferred to: PACU    Handoff Report: Identifed the Patient, Identified the Reponsible Provider, Reviewed the pertinent medical history, Discussed the surgical course, Reviewed Intra-OP anesthesia mangement and issues during anesthesia, Set expectations for post-procedure period and Allowed opportunity for questions and acknowledgement of understanding    Vitals:  Vitals Value Taken Time   /70 08/17/23 0940   Temp     Pulse 75 08/17/23 0943   Resp 14 08/17/23 0943   SpO2 96 % 08/17/23 0943   Vitals shown include unvalidated device data.    Electronically Signed By: DEBBIE Fajardo CRNA  August 17, 2023  9:44 AM

## 2023-08-18 ENCOUNTER — APPOINTMENT (OUTPATIENT)
Dept: PHYSICAL THERAPY | Facility: CLINIC | Age: 78
End: 2023-08-18
Attending: ORTHOPAEDIC SURGERY
Payer: COMMERCIAL

## 2023-08-18 VITALS
TEMPERATURE: 97.1 F | OXYGEN SATURATION: 97 % | WEIGHT: 212 LBS | RESPIRATION RATE: 16 BRPM | BODY MASS INDEX: 34.07 KG/M2 | SYSTOLIC BLOOD PRESSURE: 118 MMHG | DIASTOLIC BLOOD PRESSURE: 73 MMHG | HEART RATE: 61 BPM | HEIGHT: 66 IN

## 2023-08-18 LAB
FASTING STATUS PATIENT QL REPORTED: YES
GLUCOSE SERPL-MCNC: 109 MG/DL (ref 70–99)
HGB BLD-MCNC: 10.9 G/DL (ref 13.3–17.7)

## 2023-08-18 PROCEDURE — 82947 ASSAY GLUCOSE BLOOD QUANT: CPT | Performed by: ORTHOPAEDIC SURGERY

## 2023-08-18 PROCEDURE — 97116 GAIT TRAINING THERAPY: CPT | Mod: GP

## 2023-08-18 PROCEDURE — 36415 COLL VENOUS BLD VENIPUNCTURE: CPT | Performed by: ORTHOPAEDIC SURGERY

## 2023-08-18 PROCEDURE — 250N000013 HC RX MED GY IP 250 OP 250 PS 637: Performed by: PHYSICIAN ASSISTANT

## 2023-08-18 PROCEDURE — 97161 PT EVAL LOW COMPLEX 20 MIN: CPT | Mod: GP

## 2023-08-18 PROCEDURE — 85018 HEMOGLOBIN: CPT | Performed by: ORTHOPAEDIC SURGERY

## 2023-08-18 RX ORDER — ASPIRIN 325 MG
325 TABLET ORAL DAILY
Qty: 45 TABLET | Refills: 0 | Status: SHIPPED | OUTPATIENT
Start: 2023-08-18

## 2023-08-18 RX ADMIN — ACETAMINOPHEN 975 MG: 325 TABLET, FILM COATED ORAL at 13:41

## 2023-08-18 RX ADMIN — ASPIRIN 325 MG ORAL TABLET 325 MG: 325 PILL ORAL at 08:27

## 2023-08-18 RX ADMIN — FERROUS GLUCONATE 324 MG: 324 TABLET ORAL at 08:27

## 2023-08-18 RX ADMIN — FOLIC ACID 1000 MCG: 1 TABLET ORAL at 08:27

## 2023-08-18 RX ADMIN — OXYCODONE HYDROCHLORIDE 10 MG: 5 TABLET ORAL at 05:04

## 2023-08-18 RX ADMIN — Medication 50 MCG: at 08:27

## 2023-08-18 RX ADMIN — ACETAMINOPHEN 975 MG: 325 TABLET, FILM COATED ORAL at 05:04

## 2023-08-18 RX ADMIN — AMLODIPINE BESYLATE 2.5 MG: 2.5 TABLET ORAL at 08:27

## 2023-08-18 RX ADMIN — OXYCODONE HYDROCHLORIDE 10 MG: 5 TABLET ORAL at 13:41

## 2023-08-18 ASSESSMENT — ACTIVITIES OF DAILY LIVING (ADL)
ADLS_ACUITY_SCORE: 20
ADLS_ACUITY_SCORE: 20
ADLS_ACUITY_SCORE: 24
ADLS_ACUITY_SCORE: 20
ADLS_ACUITY_SCORE: 20
ADLS_ACUITY_SCORE: 24
ADLS_ACUITY_SCORE: 20

## 2023-08-18 NOTE — PROGRESS NOTES
Patient vital signs are at baseline: Yes  Patient able to ambulate as they were prior to admission or with assist devices provided by therapies during their stay:  No,  Reason:  PT following.  Patient MUST void prior to discharge:  Yes  Patient able to tolerate oral intake:  Yes  Pain has adequate pain control using Oral analgesics:  yes   Does patient have an identified :  Yes  Has goal D/C date and time been discussed with patient:  Yes     Diagnosis: Left total ankle arthroplasty.  Mental Status: A&Ox4, POD#1  Activity/dangle: Not OOB yet- dangle at bedside  Diet: Tolerating regular.   [place]/Voiding: Urinal   Pain:  Tylenol   Tele/Restraints/Iso:N/A  02/LDA: RA, IV SL  D/C Date: TBD  Other Info: Dressing CDI, CMS intact, No weightbearing on LLE.

## 2023-08-18 NOTE — DISCHARGE INSTRUCTIONS
Post-Operative Instruction Sheet for Foot and Ankle Surgery  Chavez Natarajan M.D.      These precautions MUST be followed for the first 24 hours after surgery:  Upon discharge, go directly home.  You MUST make arrangements for a responsible adult to stay with you the first night following surgery.  Surgery may be cancelled if you do not have someone that can stay with you.  DO NOT DRINK ALCOHOLIC BEVERAGES.  It is not unusual to feel lightheaded up to 24 hours after surgery or while taking pain medications.  If you feel lightheaded, sit up for a few minutes before standing and have someone assist you when you get up to walk or use the restroom.  Do not use any mechanical equipment or heavy machinery.  Do not make any important or legal decisions for 24 hours or while on pain medication.  You may experience dry mouth, sore throat, or sleep disturbances from the anesthesia and medications used during surgery.  Generalized muscle aches can sometimes occur.  These symptoms generally disappear by 24 hours.    The following are general guidelines and instructions about what to expect the first weeks following surgery.  These are not specific, and your recovery may be slightly different.  Please follow the instructions that are specifically written out for you after the surgery if there are any questions.    Pain Management   If you have received a nerve block, the pain relief can last anywhere from 12-30 hours, this also means you may not have sensation or movement in your foot for that amount of time. You will have pain after the block wears off!  Anticipate this and start pain meds prior to the block wearing off.   When you get home start taking your over the counter pain medications as well as the non narcotic prescribed medication right away.  If your nerve block is still intact when going to sleep I would advise taking your narcotic even if you don't have pain.  Continue to take your medications as prescribed  for the first 24-48 hours - ensure that the patient (you) are alert and have no difficulty breathing before taking the medication.  Often it may be helpful to set an alarm throughout the night to ensure you don t miss a dose of the medication and wake up with a lot more pain.  You can expect that the first two nights will be the most painful and uncomfortable. I will give you strong medication to make you as comfortable as possible, but you may still have some break-through pain.  This is not unexpected, as there are many nerve endings in the foot and ankle and many patients state the pain from foot surgery is worse than most other injuries or procedures!  After the first few days, take the medication as needed for pain.  As your pain improves, you can gradually decrease your pain meds by utilizing Tylenol or an anti-inflammatory medication.  You should also use these medications as directed early in the post-operative process to supplement the narcotic pain medication.    Dressing   Bleeding through the dressings is quite common.  This usually occurs for the first 1-2 hours after surgery. The actual bleeding has stopped by the time you see the drainage through your dressings.  You can reinforce the outside of the dressing with gauze and an Ace wrap unless otherwise directed.  In most cases, your first dressing change will be performed at your first clinic follow-up visit.  In some cases, I may allow you to change your dressings sooner.  Your dressing should be kept DRY at all times - do not shower, bathe, or wet your dressing in any way after surgery!    Wound Care  Once your stitches are removed, you can shower with soapy water and gently cleanse the incision if it is completely dry.   Do not shower or wash the incision(s) if parts of the incision(s) are open or still draining.  Do not soak the incision(s) in a bathtub or hot tub until the incision(s) is completely dry for one week.  Do not soak the incision(s) in  lake or ocean water for at least one month post-operatively.    Elevation   I recommend strict elevation of your foot above the level of your heart for 4-5 days.  Elevation of the foot remains important up to two weeks after surgery to limit swelling and help wound healing.  Elevate your foot/ankle to at least your waist level but above your heart would be best. The more you elevate your foot and ankle, the less pain you will have.   In the first few days following surgery, restrict the time your foot is  down  to 10 minutes or less at a time.  It is also good to keep your blood flowing through the operated leg and limit the risk of blood clots.  The first day following surgery, I would encourage you to get up and move around the house for a few minutes every hour and then return to elevating the foot.  After the strict elevation period (see above) is completed, you may gradually become more active. You should  listen  to your foot/ankle as to when to get off of it and elevate it again.  This may help even months after surgery.  Remember: avoid anything that hurts or makes your foot/ankle swell!    Icing   Icing can be very useful to decrease the pain and swelling of the foot.   Start by first placing a large garbage bag over the dressing.  Ice may then be placed around the extremity by using either bags of ice taped around the extremity   or you may place the extremity in a bucket filled with ice (the dressing MUST be covered with a plastic bag!).  Bags of frozen peas work well!  You should ice for no more than 20 minutes at a time and repeat at 2 hour intervals.   Do NOT place ice directly on your skin or dressing.     Activity   In most cases (unless otherwise instructed), you may not bear weight on your operated foot/ankle for 2 weeks after surgery.  That means the foot may not touch the ground when upright!  Specific weight bearing instructions for your surgery will be provided on the day of surgery.  Your toes  may experience bruising after surgery and become darker when the foot hangs down.  Unless you had surgery on those toes, it is important to actively wiggle your toes for 5-10 minutes each hour.  Many of your questions can be addressed at your 2-week follow-up appointment - please make a list of things to ask us as they come up during your recovery.    What to watch for    Severe swelling and/or pain in the calf: this could indicate a deep vein thrombosis (blood clot in leg) which requires urgent evaluation and treatment!  Profuse bleeding: that which soaks through your dressing and increases in size throughout the first day after surgery.  Blue or white toes: this indicates a lack of blood flow to the foot.   Fever greater than 101.5: fevers less than this are very common the first few days after surgery and are unlikely to indicate infection or any unexpected problem.  Severe pain: that which does not improve after pain medication, except for the first two nights.   If you have any of the above problems or any concerns, please contact my office ([phone]) and further instructions will be provided.  If you are unable to reach anyone or feel you have a medical emergency, please do not hesitate to go to the nearest urgent care or emergency department.    Medications   *Medications may take up to 24 hours to be refilled by my office.*    All pain medications, along with inactivity following surgery, can cause constipation.  Use the stool softeners as recommended, increase fluid intake to at least 1 quart per day, and increase your dietary fiber.  (The  p  fruits - peaches, plums, pears, and prunes - as well as anise/black licorice are generally helpful.)    Antibiotics may be prescribed to limit the risk of infection only in limited circumstances.  Kelfex (cephalexin) 500 mg - This is an antibiotic given in addition to the antibiotic given during surgery to help reduce the chance of post-operative infection.    Dosage: 1 tablet by mouth 4 times a day.  OR   Cleocin (clindamycin) 600 mg - This is an antibiotic given to those patients who are allergic to penicillin in addition to the antibiotic given during surgery to help reduce the chance of post-operative infection.   Dosage: 1 tablet by mouth 3 times a day.    Anti-nausea and spasms  Hydroxyzine 25 mg  - This medicine should be taken if you experience any nausea or vomiting. If you know you are sensitive to narcotics please take 1 tablet 30 minutes prior to pain medication. This may also help with any mild itching experienced with the pain medication or muscle spasms.  Dosage: 1 tablet by mouth every 6 hours as needed for nausea, itching, spasms, or adjuvant pain control.    Pain medications (you will only be given a prescription for ONE of the following!)   Ultram (tramadol) 50mg - This is a pain medication that should be taken EVERY 4 TO 6 HOURS for pain relief. You should start the medication when you arrive home after surgery, before the nerve block wears off.  The first 1-2 nights you may need to take 2 tablets every 4 hours.  You should be given enough medication to last to the first office visit.  This medication cannot be refilled over the phone!  Dosage: 1-2 tablets every 4-6 hours as needed for pain relief.  OR  Oxycodone 5mg - This is a pain medication that may be taken EVERY 3 to 4 HOURS for pain relief.  You should start the medication when you arrive home after surgery, before the nerve block wears off.  The first 1-2 nights you may need to take up to 2 or even 3 tablets every 3-4 hours. You should be given enough medication to last to the first office visit.  This medication cannot be refilled over the phone!  Dosage: 1-2 (or 3) tablets every 3-4 hours as needed for pain relief.  OR  Norco (hydrocodone/acetominophen) 5/325 mg - This is a pain medication that should be taken EVERY 4 TO 6 HOURS for pain relief. You should start the medication when you  arrive home after surgery, before the nerve block wears off.  The first 1-2 nights you may need to take 2 tablets every 4 hours.  You should be given enough medication to last to the first office visit.  This medication cannot be refilled over the phone!  Dosage: 1-2 tablets every 4-6 hours as needed for pain relief.  *Do NOT take any Tylenol while taking this medication!  You may alternate Tylenol with this medication provided you do not take greater than 3 grams of Tylenol in total over a 24 hour time period.    Blood thinner  Depending on the type of surgery and your personal risk factors, I may prescribe a medication to help limit the risk of developing a blood clot after your surgery.  The length of time to take the recommended medication will be provided and typically lasts until you are moving about normally or bearing weight on the operated foot/ankle.  ECASA (enteric coated aspirin) 325mg tablet daily.  Lovenox (enoxaparin) 40mg subcutaneous injection daily.  Xarelto 10mg tablet daily.    Stool Softener  Take an over the counter stool softener such as senna or Miralax starting the day after your surgery to prevent constipation. This is a common side effect of the narcotic pain medications.  You may stop taking this after you have regular bowel movements or no longer require use of narcotic pain medications.    Dental Implications  Dental procedures should be avoided until your incisions are healed. Furthermore, surgical procedures including hardware or an allograft require taking an antibiotic within one hour of all dental work within 6 months of surgery. Total ankle replacements require indefinite use of antibiotics prior to dental procedures. We would be happy to provide you with the necessary prescription upon request.    Follow-up Visits  You should have your initial post-operative visits already scheduled but if you do not recall the exact dates or do not believe they have been scheduled, please  contact [facility] right away to ensure that we have the appropriate visits in the system.    You will likely follow-up with my physician assistant for your first post-operative visit and for a few of the additional follow-up visits.  He works directly with me on all patients and will be able to inform me if there are any concerns during your recovery process!        You can often find additional information about your procedure or condition on the [facility] website at [email or web address] or [email or web address].    Additional information from reputable orthopaedic foot and ankle surgeons affiliated with the American Orthopaedic Foot and Ankle Society can be found at [email or web address].      Post-operative Foot and Ankle Surgery Instructions and Tips for Pain Control  Dr. Natarajan, [facility]    Non-medication Interventions  Read your post-operative instruction handout!  Elevate the leg at the heart level 95% of the time for the first 2-3 days.  Limit the amount of time the foot and ankle are  down  for no more than 10 minutes at a time the first few days.  Ice consistently for the first 2-3 days.  If on bare skin or a thin dressing, limit icing to 20 minutes per hour.  If around a splint, apply the ice behind the knee for 20 minutes per hours or over the splint around the ankle as much as tolerated.  Do not plan extra activity for the first 2-3 days after surgery.  Expect the foot or ankle will be painful - surgery hurts!  The pain will get better.  Trust the process.    Non-opiate Medications  Start these medications right away after you get home from surgery and continue on a regular schedule for at least 3 days.  As you pain allows, start to use as needed until your first clinic visit after surgery.  It may be helpful to alternate the ibuprofen/Celebrex and Tylenol to maximize pain relief.  In rare cases, I may recommend avoiding ibuprofen or aleve  after surgery - this will be made clear at the time of surgery.  Motrin or Advil (ibuprofen) 600mg every 6 hours OR Celebrex 100mg every 12 hours.  Tylenol (acetaminophen) 650mg every 6 hours.    Neurontin (gabapentin) 300mg every 8 hours for the first 3 days only.    Hydroxyzine 25mg (or 10mg if >65 years of age) every 6 hours.    Opiate Medications  These medications should be used sparingly, just as needed, and for the first few days after surgery.  Please see the below guidelines for details.  Ultram (tramadol) 50mg, 1-2 tablets every 4 hours as needed.  OR  Oxycodone 5mg, 1-2 tablets every 3 hours as needed.    Opiate pain medications can be very effective, but carry a number of potentially harmful side effects including tolerance and addiction if used inappropriately.  They will be the most effective the first few days following surgery in the following situations:  If you had a nerve block before surgery, take one pill a few hours after you get home from the surgery center.  Keep to a regular schedule with the medication, taking just one pill every 4 hours until the block wears off (tingling, pins and needles, increased movement in the toes, increased pain, etc.).  Take 1-2 pills again 4 hours later.  Take 1-2 pills at bedtime the first few days after surgery to help sleep and limit pain through the night.  Take 1-2 pills for  rescue  when pain is not controlled by the regularly scheduled medications and non-medication interventions.  You should generally feel less need for the opiate pain medication after the first few days after surgery.  Remember, foot and ankle surgery hurts!  The goal of medication management is to  take the edge off  and keep the pain level tolerable.  Trust the process - the pain will get better!    Multiple studies indicate that opioid pain medication is not needed beyond a few days postoperatively and prolonged use of these medications can actually lead to increased perception of  pain and tolerance/addiction issues.  The CDC and state boards have been recommending and enforcing restrictions on opioid prescribing in light of the significant consequences that arise from chronic opioid use and [facility] is adopting many of these recommendations for your safety and well-being.      Apollo et al, Satisfaction with Pain Relief After Operative Treatment of an Ankle Fracture, Injury. 2012; 43(11):1958-61.  Apollo et al, Pain Relief After Operative Treatment of an Extremity Fracture, JBJS Am. 2017; 99:1908-15.  Jaskaran et al, Support for Safer Opioid Prescribing Practices, SalesPredict Am. 2017; 99:1945-55.  April et al, Liposomal Bupivacaine in Forefoot Surgery, Foot Ankle Int. 2015; 36(5):503-7.  Denice et al, Addition of Pregabalin to Multimodal Analgesic Therapy Following Ankle Surgery, Reg Anesth Pain Med. 2012; 37(3):302-7.

## 2023-08-18 NOTE — PROGRESS NOTES
"Pt found kneeling next to bed on floor. Unwitnessed fall. Pt stated \" I knelt off the bed to look for my call light.\" Pt denied hitting head or LOC. Pt helped back to bed. No new C/O pain, VSS.  "

## 2023-08-18 NOTE — PROGRESS NOTES
08/18/23 1000   Appointment Info   Signing Clinician's Name / Credentials (PT) Maryam Lake, SHUKRI   Student Supervision Direct supervision provided  (Neena Chandler, TEODORA)   Living Environment   People in Home spouse   Current Living Arrangements house   Home Accessibility stairs to enter home   Number of Stairs, Main Entrance 2   Stair Railings, Main Entrance railing on right side (ascending)   Transportation Anticipated family or friend will provide   Living Environment Comments Bed and bathroom on same floor. Spouse available to assist as needed. Has tub with no grab bars. Walk in shower avaible on upper level (would need to negotiate 12 stairs /rail on left)   Self-Care   Usual Activity Tolerance moderate   Current Activity Tolerance fair   Equipment Currently Used at Home none   Fall history within last six months no  (Tripping a couple of times.)   Activity/Exercise/Self-Care Comment Has knee scooter, crutches, and FWW at home.   General Information   Onset of Illness/Injury or Date of Surgery 08/17/23   Referring Physician Chavez Natarajna MD.   Patient/Family Therapy Goals Statement (PT) To go home   Pertinent History of Current Problem (include personal factors and/or comorbidities that impact the POC) 77 yo male, left TAA s/p POD#1   Weight-Bearing Status - LLE nonweight-bearing   Weight-Bearing Status - RLE full weight-bearing   Cognition   Affect/Mental Status (Cognition) WFL   Orientation Status (Cognition) oriented x 4   Follows Commands (Cognition) WNL   Pain Assessment   Patient Currently in Pain No  (1/10)   Integumentary/Edema   Integumentary/Edema no deficits were identifed   Posture    Posture Forward head position   Range of Motion (ROM)   Range of Motion ROM deficits secondary to surgical procedure   Strength (Manual Muscle Testing)   Strength (Manual Muscle Testing) Able to perform L SLR;Able to perform R SLR   Bed Mobility   Bed Mobility no deficits identified   Transfers   Transfers sit-stand  transfer   Maintains Weight-bearing Status (Transfers) verbal cues to maintain   Impairments Contributing to Impaired Transfers impaired balance   Comment, (Transfers) CGA w/ knee scooter   Sit-Stand Transfer   Sit-Stand [place] (Transfers) contact guard   Assistive Device (Sit-Stand Transfers) other (see comments)  (L knee scooter)   Gait/Stairs (Locomotion)   [place] Level (Gait) supervision   Assistive Device (Gait) other (see comments)  (L knee scooter)   Distance in Feet 5'   Distance in Feet (Gait) 200'   Balance   Balance no deficits were identified   Sensory Examination   Sensory Perception patient reports no sensory changes   Coordination   Coordination no deficits were identified   Muscle Tone   Muscle Tone no deficits were identified   Clinical Impression   Criteria for Skilled Therapeutic Intervention Yes, treatment indicated   PT Diagnosis (PT) Impaired gait   Influenced by the following impairments NWB status on LLE   Functional limitations due to impairments Impaired functional mobility   Clinical Presentation (PT Evaluation Complexity) Stable/Uncomplicated   Clinical Presentation Rationale Clinical judgement   Clinical Decision Making (Complexity) low complexity   Planned Therapy Interventions (PT) gait training;transfer training;stair training   Risk & Benefits of therapy have been explained evaluation/treatment results reviewed;care plan/treatment goals reviewed;risks/benefits reviewed;current/potential barriers reviewed;participants voiced agreement with care plan;participants included;patient   PT Total Evaluation Time   PT Eval, Low Complexity Minutes (17380) 15   Plan of Care Review   Plan of Care Reviewed With patient   Physical Therapy Goals   PT Frequency One time eval and treatment only   PT Predicted Duration/Target Date for Goal Attainment 08/25/23   PT Goals Transfers;Gait;Stairs   PT: Transfers Supervision/stand-by assist;Assistive device;Within precautions;Goal Met   PT: Gait  "Within precautions;Assistive device;100 feet;Goal Met;Modified independent   PT: Stairs Supervision/stand-by assist;Assistive device;Within precautions;2 stairs;Rail on right;Goal Met   Interventions   Interventions Quick Adds Gait Training;Therapeutic Activity   Therapeutic Activity   Therapeutic Activities: dynamic activities to improve functional performance Minutes (20465) 5   Symptoms Noted During/After Treatment None   Treatment Detail/Skilled Intervention Greeted pt supine in bed, pt agreed to PT. Educated pt on LLE NWB status. Pt voiced understanding. Pt performed bed mobility w/ IND. Pt performed sit > stand x 1 w/ CGA and knee scooter with verbal cues to push through hands on the bed. Pt sat to rest after completing stairs and progressed to sit > stand w/ SBA and knee scooter. After ambulation, pt performed sit <> stand x2 w/ SBA and walker to move from bed to chair. Pt ended session seated in the chair with all needs met and call light within reach and alarm on.   Gait Training   Gait Training Minutes (12095) 25   Symptoms Noted During/After Treatment (Gait Training) none   Treatment Detail/Skilled Intervention Pt ambulated w/ SBA progressing to supervision with L knee scooter with left leaning trunk posture. Verbal cues for upright trunk. Good carryover with cues. Pt educated on appropriate walker height and adjustment, pt voiced understanding. Pt was educated on negotiating stairs by going \"up with the good and down with the bad\". Pt negotiated 4 steps x 2 reps with single axillary crutch w/ CGA. Pt needed verbal reminders to maintain LLE NWB status. Pt ambulated with FWW and SBA from bed to chair with verbal cues to maintain LLE NWB status. NLOB.   [place] Level (Gait Training) stand-by assist   Weight Bearing (Gait Training) nonweight-bearing   Assistive Device (Gait Training) standard walker;axillary crutches;other (see comments)  (L knee scooter)   Stair Railings present on right side "   Physical Assist/Nonphysical Assist (Stairs) verbal cues;nonverbal cues (demo/gestures)   Level of [place] (Stairs) contact guard   Assistive Device (Stairs) axillary crutches  (L crutch asecnding, R crutch descending)   PT Discharge Planning   PT Plan DC   PT Discharge Recommendation (DC Rec) home with assist   PT Rationale for DC Rec Pt is moving well but is below baseline. Pt is currently completing all transfers, ambulation, and bed mobility with SBA to modified independent, and stairs with CGA. Anticipate at time of DC pt will be able to safely return home with assist of his wife. Pt has all equipment needed for home use.   PT Brief overview of current status Sit <> stand w/ SBA and knee scooter, ambulate w/ modified independent and knee scooter, stairs w/ single axillary crutch and R rail ascending w/ CGA, bed mobility w/ IND   Total Session Time   Timed Code Treatment Minutes 30   Total Session Time (sum of timed and untimed services) 45

## 2023-08-18 NOTE — PROGRESS NOTES
Orthopedic Surgery  Luis Manuel Winter  2023  Admit Date:  2023  POD 1 Day Post-Op  S/P Procedure(s):  Left total ankle arthroplasty, achilles lengthening, modified brostrom lateral ligament reconstruction    Patient is feeling good, has people lined up to stay with him after discharge.  Pain controlled.  Tolerating oral intake.  No events overnight. Discharge expectations and instructions discussed.    Alert and orient to person, place, and time.  Vital Sign Ranges  Temperature Temp  Av.1  F (36.7  C)  Min: 97.5  F (36.4  C)  Max: 100.4  F (38  C)   Blood pressure Systolic (24hrs), Av , Min:100 , Max:132        Diastolic (24hrs), Av, Min:59, Max:83      Pulse Pulse  Av.8  Min: 66  Max: 90   Respirations Resp  Avg: 15.5  Min: 12  Max: 19   Pulse oximetry SpO2  Av.9 %  Min: 94 %  Max: 99 %       Left short leg splint is clean, dry, and intact. Minimal erythema of the surrounding skin.  Bilateral calves are soft, non-tender.  left lower extremity is NVI.    Labs:  No results for input(s): POTASSIUM in the last 26101 hours.  Recent Labs   Lab Test 23  0637   HGB 10.9*     No results for input(s): INR in the last 19591 hours.  No results for input(s): PLT in the last 84639 hours.    A/P  1. S/p left TAA   Continue aspirin 325mg every day  for DVT prophylaxis.     Mobilize with PT/OT NWB.     Continue current pain regimen  Continue wedge elevation.    2. Disposition   Anticipate d/c to home today with family support    Kjerstin L Foss, PA-C

## 2024-10-06 ENCOUNTER — HEALTH MAINTENANCE LETTER (OUTPATIENT)
Age: 79
End: 2024-10-06

## (undated) DEVICE — SU MONOCRYL 3-0 PS-2 27" Y427H

## (undated) DEVICE — GOWN IMPERVIOUS SPECIALTY XLG/XLONG 32474

## (undated) DEVICE — IMM LEG ELEVATOR 79-90191

## (undated) DEVICE — ESU PENCIL W/SMOKE EVAC NEPTUNE STRYKER 0703-046-000

## (undated) DEVICE — BLADE SAW TORNIER NARROW 11-2614

## (undated) DEVICE — SOL WATER IRRIG 1000ML BOTTLE 2F7114

## (undated) DEVICE — BNDG ELASTIC 4" DBL LENGTH UNSTERILE 6611-14

## (undated) DEVICE — PREP CHLORAPREP 26ML TINTED HI-LITE ORANGE 930815

## (undated) DEVICE — DRAPE C-ARMOR 5 SIDED 5523

## (undated) DEVICE — LINEN TOWEL PACK X5 5464

## (undated) DEVICE — DRAPE C-ARM 60X42" 1013

## (undated) DEVICE — DRSG XEROFORM 1X8"

## (undated) DEVICE — SU VICRYL 2-0 CT-2 27" UND J269H

## (undated) DEVICE — SU ETHILON 3-0 FS-1 18" 669H

## (undated) DEVICE — BLADE SAW RECIP STRK 70X6X0.025MM 0277-096-250S5

## (undated) DEVICE — DECANTER BAG 2002S

## (undated) DEVICE — BLADE SAW INTEGRA 85X21X1.27MM SAW6945T

## (undated) DEVICE — CAST PLASTER SPLINT 5X30" 7395

## (undated) DEVICE — DRILL BIT TORNIER 3MM LONG DWD060

## (undated) DEVICE — BLADE SW 12.5MM BRASSELER THK.94MM 70MM I SD HUB STRL DISP

## (undated) DEVICE — PACK EXTREMITY SOP15EXFSD

## (undated) DEVICE — DRILL BIT L135 MM OD3 MM LJV528T

## (undated) DEVICE — CAST PADDING 4" UNSTERILE 9044

## (undated) DEVICE — Device

## (undated) DEVICE — BLADE SAW RECIP STRK 77.5X11X1.23MM 0277-096-326

## (undated) DEVICE — DRAPE IOBAN INCISE 23X17" 6650EZ

## (undated) DEVICE — SET OF 12 PINS +1 REAMER FOR SALTO ANKLE LJV-529T

## (undated) DEVICE — DRSG GAUZE 4X4" 3033

## (undated) DEVICE — BLADE SAW LG BONE TORNIER 70X13X1.27MM SAW6944T

## (undated) RX ORDER — DEXAMETHASONE SODIUM PHOSPHATE 4 MG/ML
INJECTION, SOLUTION INTRA-ARTICULAR; INTRALESIONAL; INTRAMUSCULAR; INTRAVENOUS; SOFT TISSUE
Status: DISPENSED
Start: 2023-08-17

## (undated) RX ORDER — PROPOFOL 10 MG/ML
INJECTION, EMULSION INTRAVENOUS
Status: DISPENSED
Start: 2023-08-17

## (undated) RX ORDER — ONDANSETRON 2 MG/ML
INJECTION INTRAMUSCULAR; INTRAVENOUS
Status: DISPENSED
Start: 2023-08-17

## (undated) RX ORDER — CEFAZOLIN SODIUM/WATER 2 G/20 ML
SYRINGE (ML) INTRAVENOUS
Status: DISPENSED
Start: 2023-08-17

## (undated) RX ORDER — TRANEXAMIC ACID 650 MG/1
TABLET ORAL
Status: DISPENSED
Start: 2023-08-17

## (undated) RX ORDER — GLYCOPYRROLATE 0.2 MG/ML
INJECTION, SOLUTION INTRAMUSCULAR; INTRAVENOUS
Status: DISPENSED
Start: 2023-08-17